# Patient Record
Sex: FEMALE | Race: WHITE | NOT HISPANIC OR LATINO | Employment: OTHER | URBAN - METROPOLITAN AREA
[De-identification: names, ages, dates, MRNs, and addresses within clinical notes are randomized per-mention and may not be internally consistent; named-entity substitution may affect disease eponyms.]

---

## 2017-05-22 PROBLEM — K21.9 GASTROESOPHAGEAL REFLUX DISEASE: Status: ACTIVE | Noted: 2017-05-22

## 2017-05-22 PROBLEM — Z99.81 ON SUPPLEMENTAL OXYGEN THERAPY: Status: ACTIVE | Noted: 2017-05-22

## 2017-05-22 PROBLEM — E03.4 HYPOTHYROIDISM DUE TO ACQUIRED ATROPHY OF THYROID: Status: ACTIVE | Noted: 2017-05-22

## 2017-05-22 PROBLEM — J44.9 CHRONIC OBSTRUCTIVE PULMONARY DISEASE: Status: ACTIVE | Noted: 2017-05-22

## 2017-05-22 PROBLEM — K86.89 PANCREATIC INSUFFICIENCY: Status: ACTIVE | Noted: 2017-05-22

## 2017-05-22 PROBLEM — E78.5 OTHER AND UNSPECIFIED HYPERLIPIDEMIA: Status: ACTIVE | Noted: 2017-05-22

## 2017-07-17 PROBLEM — M25.512 CHRONIC LEFT SHOULDER PAIN: Status: ACTIVE | Noted: 2017-07-17

## 2017-07-17 PROBLEM — G89.29 CHRONIC LEFT SHOULDER PAIN: Status: ACTIVE | Noted: 2017-07-17

## 2017-08-03 ENCOUNTER — OFFICE VISIT (OUTPATIENT)
Dept: PODIATRY | Facility: CLINIC | Age: 82
End: 2017-08-03
Payer: MEDICARE

## 2017-08-03 VITALS — HEIGHT: 61 IN

## 2017-08-03 DIAGNOSIS — E11.49 TYPE II DIABETES MELLITUS WITH NEUROLOGICAL MANIFESTATIONS: Primary | ICD-10-CM

## 2017-08-03 DIAGNOSIS — I73.9 PERIPHERAL VASCULAR DISEASE: ICD-10-CM

## 2017-08-03 DIAGNOSIS — R60.0 PERIPHERAL EDEMA: ICD-10-CM

## 2017-08-03 DIAGNOSIS — L98.8 MACERATION OF SKIN: ICD-10-CM

## 2017-08-03 DIAGNOSIS — B35.1 ONYCHOMYCOSIS DUE TO DERMATOPHYTE: ICD-10-CM

## 2017-08-03 PROCEDURE — 99499 UNLISTED E&M SERVICE: CPT | Mod: S$GLB,,, | Performed by: PODIATRIST

## 2017-08-03 PROCEDURE — 99999 PR PBB SHADOW E&M-EST. PATIENT-LVL II: CPT | Mod: PBBFAC,,, | Performed by: PODIATRIST

## 2017-08-03 PROCEDURE — 11721 DEBRIDE NAIL 6 OR MORE: CPT | Mod: Q9,S$GLB,, | Performed by: PODIATRIST

## 2017-08-03 RX ORDER — INSULIN GLARGINE 100 [IU]/ML
INJECTION, SOLUTION SUBCUTANEOUS
COMMUNITY
Start: 2017-06-05

## 2017-08-03 RX ORDER — SUCRALFATE 1 G/1
1 TABLET ORAL 4 TIMES DAILY
COMMUNITY

## 2017-08-03 NOTE — PROGRESS NOTES
Subjective:      Patient ID: Janis Louie is a 89 y.o. female.    Chief Complaint: Diabetes Mellitus (PCP Gino 7/17/17  A1C 7/12/17  6.9); Diabetic Foot Exam; and Nail Care    Janis is a 89 y.o. female who presents to the clinic for routine evaluation and treatment of diabetic feet. Janis has a past medical history of Anemia; Anxiety; Arthralgia; CAD (coronary artery disease); CHF (congestive heart failure); COPD (chronic obstructive pulmonary disease); Diabetes mellitus, type II; Diabetic neuropathy; DJD (degenerative joint disease) of knee; Eczema; Eructation; GERD (gastroesophageal reflux disease); History of malignant neoplasm of skin; Hypercalcemia; Hyperglycemia; Hyperlipidemia; Hypertension; Hypothyroidism; Macular degeneration; Mild emphysema; Ovarian cyst; Pancreatic disorder; Rheumatoid factor positive; and Villous adenoma of colon. Patient relates no major problem with feet. Only complaints today consist of toenails in need of trimming.  Denies being painful with wearing shoe gear.  Has not attempted to self treat.  Denies any additional pedal complaints.      PCP: Alexis Guadalupe MD    Date Last Seen by PCP: 7/17/17    Current shoe gear: Casual shoes    Hemoglobin A1C   Date Value Ref Range Status   07/12/2017 6.9 (H) 0.0 - 5.6 % Final     Comment:     Reference Interval:  5.0 - 5.6 Normal   5.7 - 6.4 High Risk   > 6.5 Diabetic    Hgb A1c results are standardized based on the (NGSP) National   Glycohemoglobin Standardization Program.    Hemoglobin A1C levels are related to mean serum/plasma glucose   during the preceding 2-3 months.        02/11/2016 6.8 (H) 0.0 - 5.6 % Final     Comment:     Reference Interval:  5.0 - 5.6 Normal   5.7 - 6.4 High Risk   > 6.5 Diabetic    Hgb A1c results are standardized based on the (NGSP) National   Glycohemoglobin Standardization Program.    Hemoglobin A1C levels are related to mean serum/plasma glucose   during the preceding 2-3 months.                 Past Medical History:   Diagnosis Date    Anemia     Anxiety     Arthralgia     CAD (coronary artery disease)     CHF (congestive heart failure)     COPD (chronic obstructive pulmonary disease)     Diabetes mellitus, type II     Diabetic neuropathy     DJD (degenerative joint disease) of knee     Eczema     Eructation     GERD (gastroesophageal reflux disease)     History of malignant neoplasm of skin     Hypercalcemia     Hyperglycemia     Hyperlipidemia     Hypertension     Hypothyroidism     Macular degeneration     DRY     Mild emphysema     Ovarian cyst     Pancreatic disorder     Rheumatoid factor positive     Villous adenoma of colon        Past Surgical History:   Procedure Laterality Date    APPENDECTOMY      CARDIAC CATHETERIZATION      had complications was in ICU [facility]     CARPAL TUNNEL RELEASE      CORONARY ANGIOPLASTY WITH STENT PLACEMENT      HYSTERECTOMY      plastic surgery on left leg      ROTATOR CUFF REPAIR Right     squamous cell removed from leg      TONSILLECTOMY      TOTAL HIP ARTHROPLASTY Right        Family History   Problem Relation Age of Onset    Heart disease Father     Stroke Mother        Social History     Social History    Marital status:      Spouse name: N/A    Number of children: N/A    Years of education: N/A     Social History Main Topics    Smoking status: Former Smoker    Smokeless tobacco: Never Used      Comment: Quit 1930    Alcohol use 0.0 oz/week      Comment: very rare, 4-5 times a year     Drug use: Unknown    Sexual activity: Not Asked     Other Topics Concern    None     Social History Narrative    None       Current Outpatient Prescriptions   Medication Sig Dispense Refill    amlodipine-valsartan (EXFORGE) 5-320 mg per tablet Take 1 tablet by mouth once daily. 90 tablet 3    ammonium lactate (LAC-HYDRIN) 12 % lotion Apply as directed      clopidogrel (PLAVIX) 75 mg tablet Take 75 mg by mouth once  daily.      dorzolamide-timolol 2-0.5% (COSOPT) 22.3-6.8 mg/mL ophthalmic solution Place 1 drop into both eyes 2 (two) times daily. 10 mL 3    fenofibrate (TRICOR) 48 MG tablet Take 1 tablet (48 mg total) by mouth once daily. 90 tablet 3    glycerin adult suppository Place 1 suppository rectally as needed for Constipation. 50 suppository 1    insulin glargine (LANTUS) 100 unit/mL injection Inject 14 Units into the skin every evening. 3 vial 3    IODINE MISC by Misc.(Non-Drug; Combo Route) route. APPLY CLEAR IODINE BETWEEN FOURTH AND FIFTH TOES ON BOTH FEET AFTER BATH      IRON POLYSACCHARIDES COMPLEX (FERREX 150 ORAL) Take 150 mg by mouth once daily.       LANTUS SOLOSTAR 100 unit/mL (3 mL) InPn pen       levothyroxine (SYNTHROID) 112 MCG tablet TAKE 1 TABLET BY MOUTH IN THE MORNING 30 MINUTES BEFORE BREAKFAST 90 tablet 3    lipase-protease-amylase 24,000-76,000-120,000 units (CREON) 24,000-76,000 -120,000 unit capsule TAKE ONE CAPSULE BY MOUTH THREE TIMES DAILY WITH MEALS 90 capsule 10    meloxicam (MOBIC) 7.5 MG tablet TAKE 1 TABLET EVERY DAY FOR PAIN PRN 90 tablet 1    metformin (GLUCOPHAGE) 500 MG tablet Take 2 tablets (1,000 mg total) by mouth 2 (two) times daily. 360 tablet 1    METHYL SALICYLATE/MENTH/CAMPH (SALONPAS TOP) Apply topically daily as needed.      metoprolol succinate (TOPROL-XL) 25 MG 24 hr tablet Take 1 tablet (25 mg total) by mouth once daily. 90 tablet 3    miconazole NITRATE 2 % (ZEASORB AF) 2 % top powder Apply topically as needed for Itching. 85 g 1    oxybutynin (DITROPAN-XL) 5 MG TR24 Take 1 tablet (5 mg total) by mouth every evening. 90 tablet 3    pantoprazole (PROTONIX) 40 MG tablet TAKE 1 TABLET (40 MG TOTAL) BY MOUTH ONCE DAILY. 90 tablet 3    POLYETHYLENE GLYCOL 3350 (MIRALAX ORAL) Take 2 capsules by mouth daily as needed. 2 capsules in 8 oz water daily prn      potassium chloride (KLOR-CON) 10 MEQ TbSR TAKE  ONE TABLET BY MOUTH DAILY 90 tablet 0    potassium  "citrate (UROCIT-K) 10 mEq (1,080 mg) TbSR Take 1 tablet (10 mEq total) by mouth once daily. 90 tablet 3    sucralfate (CARAFATE) 1 gram tablet Take 1 g by mouth 4 (four) times daily.      temazepam (RESTORIL) 15 mg Cap Take 1 capsule (15 mg total) by mouth every evening. 30 capsule 0    tiotropium (SPIRIVA) 18 mcg inhalation capsule Inhale 1 capsule (18 mcg total) into the lungs once daily. 90 capsule 3    tramadol-acetaminophen 37.5-325 mg (ULTRACET) 37.5-325 mg Tab Take 37.5325 tablets by mouth once daily.      alcohol swabs PadM Apply 1 each topically as directed. BD SINGLE USE SWAB  USE AS DIRECTED TO CHECK BLOOD SUGAR TWICE DAILY  DX CODE: E11.40      blood sugar diagnostic (BLOOD GLUCOSE TEST) Strp 1 strip by Misc.(Non-Drug; Combo Route) route 2 (two) times daily. HUMANA TRUE METRIX 200 strip 3    blood-glucose meter kit 1 each by Other route 2 (two) times daily. HUMA[employer] TRUE METRIX AIR 1 each 0    furosemide (LASIX) 40 MG tablet Take 1 tablet (40 mg total) by mouth As instructed. 40mg po am, 20 mg pm 135 tablet 3    lancets (TRUEPLUS LANCETS) 28 gauge [facility] [place] Inject 1 lancet into the skin once daily. 200 each 3    MEDICAL SUPPLY, MISCELLANEOUS (COMPRESSION STOCKINGS MISC) by Misc.(Non-Drug; Combo Route) route. Wear daily      pen needle, diabetic (BD ULTRA-FINE WEI PEN NEEDLES) 32 gauge x 5/32" Ndle USE  1  PEN NEEDLE TO INJECT   AT BEDTIME 50 each 3    sodium chloride (SALINE NASAL) 0.65 % nasal spray 1 spray by Nasal route as needed for Congestion.      UNABLE TO FIND Take 3 capsules by mouth daily as needed. medication name: Bob      UNABLE TO FIND Place 2 strips onto the skin once daily at 6am. medication name: Salon Pas. Apply 2 strips to left shoulder daily       No current facility-administered medications for this visit.        Allergies   Allergen Reactions    Niacin-Lovastatin      Other reaction(s): myalgia    Cefuroxime      Tongue And Throat Irritation    Celebrex [Celecoxib]     "  Other reaction(s): hives    Ezetimibe      Other reaction(s): muscle spasms, muscle spasms    Pravastatin      Other reaction(s): JOINT PAIN, JOINT PAIN    Statins-Hmg-Coa Reductase Inhibitors          Review of Systems   Constitution: Negative for chills, decreased appetite, diaphoresis and fever.   Skin: Positive for color change, dry skin and nail changes.   Musculoskeletal: Positive for arthritis. Negative for joint pain, muscle cramps and muscle weakness.   Neurological: Positive for paresthesias.   Psychiatric/Behavioral: Negative for altered mental status.           Objective:      Physical Exam   Constitutional: She is oriented to person, place, and time. She appears well-developed and well-nourished. No distress.   Cardiovascular:   Pulses:       Dorsalis pedis pulses are 1+ on the right side, and 1+ on the left side.        Posterior tibial pulses are 2+ on the right side, and 2+ on the left side.   CFT 3-4 seconds bilateral.  Pedal hair growth decreased bilateral.  Varicosities noted to bilateral lower extremity.  1+ pitting noted to bilateral lower extremity. Toes are cool to touch with increasing warmness proximal.     Musculoskeletal: She exhibits edema. She exhibits no tenderness.   Muscle strength 5/5 in all muscle groups bilateral.  No tenderness nor crepitation with ROM of foot/ankle joints bilateral.  No tenderness with palpation of bilateral foot and ankle.  Bilateral pes planus foot type.  Bilateral hallux abducto valgus.  Bilateral semi-reducible contracture of toes 2-5.       Neurological: She is alert and oriented to person, place, and time. She has normal strength. No sensory deficit.   Hypersensitivity noted during both the vibratory and protective sensation exam.      Both tests noted to be normal and symmetric, unchanged in comparison to previous exam.       Skin: Skin is warm, dry and intact. No abrasion, no bruising, no burn, no ecchymosis, no laceration, no lesion, no petechiae and  no rash noted. She is not diaphoretic. No cyanosis or erythema. No pallor. Nails show no clubbing.   Pedal skin appears thin and atrophic bilateral.   Toenails x 10 appear thickened by 2 mm's, elongated by 2 mm's, and discolored with subungual debris.  No open wounds or hyperkeratoses noted bilateral.  Maceration noted to all webspaces of bilateral foot.  No adjacent sign of infection or break in skin integrity.               Assessment:       Encounter Diagnoses   Name Primary?    Type II diabetes mellitus with neurological manifestations Yes    Peripheral vascular disease     Onychomycosis due to dermatophyte     Maceration of skin     Peripheral edema          Plan:       Janis was seen today for diabetes mellitus, diabetic foot exam and nail care.    Diagnoses and all orders for this visit:    Type II diabetes mellitus with neurological manifestations    Peripheral vascular disease    Onychomycosis due to dermatophyte    Maceration of skin    Peripheral edema      I counseled the patient on her conditions, their implications and medical management.    Applied betadine to all webspaces of bilateral foot.  Orders written for this to be performed daily by nursing staff to prevent further deterioration of skin integrity.    Advised to continue with elevation of the lower extremities while at rest.      Shoe inspection. Diabetic Foot Education. Patient reminded of the importance of good nutrition and blood sugar control to help prevent podiatric complications of diabetes. Patient instructed on proper foot hygeine. We discussed wearing proper shoe gear, daily foot inspections, never walking without protective shoe gear, never putting sharp instruments to feet    With patient's permission, nails were aggressively reduced and debrided x 10 to their soft tissue attachment mechanically and with electric , removing all offending nail and debris. Patient relates relief following the procedure. She will  continue to monitor the areas daily, inspect her feet, wear protective shoe gear when ambulatory, moisturizer to maintain skin integrity and follow in this office in approximately 2-3 months, sooner p.r.n.      Return in about 3 months (around 11/3/2017).    Davide Palomares DPM